# Patient Record
Sex: FEMALE | Race: BLACK OR AFRICAN AMERICAN | Employment: OTHER | URBAN - METROPOLITAN AREA
[De-identification: names, ages, dates, MRNs, and addresses within clinical notes are randomized per-mention and may not be internally consistent; named-entity substitution may affect disease eponyms.]

---

## 2023-11-10 ENCOUNTER — APPOINTMENT (OUTPATIENT)
Dept: CT IMAGING | Age: 88
End: 2023-11-10
Payer: MEDICARE

## 2023-11-10 ENCOUNTER — HOSPITAL ENCOUNTER (EMERGENCY)
Age: 88
Discharge: HOME | End: 2023-11-10
Attending: STUDENT IN AN ORGANIZED HEALTH CARE EDUCATION/TRAINING PROGRAM
Payer: MEDICARE

## 2023-11-10 VITALS
WEIGHT: 227 LBS | RESPIRATION RATE: 16 BRPM | HEART RATE: 82 BPM | SYSTOLIC BLOOD PRESSURE: 159 MMHG | OXYGEN SATURATION: 98 % | TEMPERATURE: 98.1 F | BODY MASS INDEX: 35.63 KG/M2 | HEIGHT: 67 IN | DIASTOLIC BLOOD PRESSURE: 72 MMHG
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
## 2023-11-10 DIAGNOSIS — S09.90XA CLOSED HEAD INJURY, INITIAL ENCOUNTER: Primary | ICD-10-CM

## 2023-11-10 PROCEDURE — 72125 CT NECK SPINE W/O DYE: CPT

## 2023-11-10 PROCEDURE — 70450 CT HEAD/BRAIN W/O DYE: CPT

## 2023-11-10 PROCEDURE — 99284 EMERGENCY DEPT VISIT MOD MDM: CPT

## 2023-11-10 RX ORDER — CETIRIZINE HYDROCHLORIDE 5 MG/1
5 TABLET ORAL
COMMUNITY
Start: 2023-09-19 | End: 2023-12-18

## 2023-11-10 RX ORDER — METOPROLOL SUCCINATE 100 MG/1
100 TABLET, EXTENDED RELEASE ORAL 2 TIMES DAILY
COMMUNITY
Start: 2023-02-10

## 2023-11-10 RX ORDER — AMLODIPINE BESYLATE 10 MG/1
10 TABLET ORAL DAILY
Qty: 30 TABLET | Refills: 2 | COMMUNITY
Start: 2023-09-19 | End: 2023-12-18

## 2023-11-10 ASSESSMENT — PAIN DESCRIPTION - LOCATION: LOCATION: HEAD

## 2023-11-10 ASSESSMENT — PAIN SCALES - GENERAL: PAINLEVEL_OUTOF10: 3

## 2023-11-10 NOTE — ED PROVIDER NOTES
Emergency Department Provider Note       PCP: Julius Rg MD   Age: 80 y.o. Sex: female     DISPOSITION Decision To Discharge 11/10/2023 11:16:00 AM       ICD-10-CM    1. Closed head injury, initial encounter  S09.90XA       2. Fall, initial encounter  James Nunez. [facility]           Medical Decision Making     Complexity of Problems Addressed:  1 or more acute illnesses that pose a threat to life or bodily function. Data Reviewed and Analyzed:  I independently ordered and reviewed each unique test.  I reviewed external records: provider visit note from PCP. The patients assessment required an independent historian: family. The reason they were needed is important historical information not provided by the patient. I interpreted the CT Scan No large intracranial hemorrhage noted. Discussion of management or test interpretation. 42-year-old female presents to the [facility] part with family at bedside. Family helps with history. Reports patient was walking to the house, tripped and fell face first onto hardwood floor. Patient is on a blood thinner. No LOC. Patient has large goose egg on her right forehead. Normal neurologic exam.  No neck pain. Will obtain imaging of her head and neck. Imaging revealed no emergent findings, no evidence of intracranial hemorrhage. Will discharge home with outpatient follow-up and return precautions. Patient and family voiced understanding and agreement. Risk of Complications and/or Morbidity of Patient Management:  OTC medication management as well as shared decision-making         History       42-year-old female presents to the [facility] part with family at bedside. Family helps with history. Reports patient was walking to the house, tripped and fell face first onto hardwood floor. Patient is on a blood thinner. No LOC. Patient has large goose egg on her right forehead. Normal neurologic exam.  No neck pain.            Review of Systems    Physical

## 2023-11-10 NOTE — DISCHARGE INSTRUCTIONS
Use ice to help with swelling to your forehead, take Tylenol for discomfort. Follow-up with primary care physician within 1 week as needed. Return to the ER for worsening or worrisome symptoms.

## 2023-11-10 NOTE — ED TRIAGE NOTES
Patient reports falling fwd hitting floor.  Pt has hematoma to forehead pt states she takes Xarelto, denies loc